# Patient Record
Sex: MALE | Race: OTHER | HISPANIC OR LATINO | Employment: FULL TIME | URBAN - METROPOLITAN AREA
[De-identification: names, ages, dates, MRNs, and addresses within clinical notes are randomized per-mention and may not be internally consistent; named-entity substitution may affect disease eponyms.]

---

## 2018-08-29 ENCOUNTER — APPOINTMENT (EMERGENCY)
Dept: RADIOLOGY | Facility: HOSPITAL | Age: 38
End: 2018-08-29
Payer: COMMERCIAL

## 2018-08-29 ENCOUNTER — HOSPITAL ENCOUNTER (EMERGENCY)
Facility: HOSPITAL | Age: 38
Discharge: HOME | End: 2018-08-30
Attending: EMERGENCY MEDICINE | Admitting: EMERGENCY MEDICINE
Payer: COMMERCIAL

## 2018-08-29 DIAGNOSIS — M25.431 WRIST SWELLING, RIGHT: ICD-10-CM

## 2018-08-29 DIAGNOSIS — M79.89 SWELLING OF RIGHT HAND: Primary | ICD-10-CM

## 2018-08-29 PROCEDURE — 73130 X-RAY EXAM OF HAND: CPT

## 2018-08-30 VITALS
HEART RATE: 82 BPM | WEIGHT: 249 LBS | OXYGEN SATURATION: 95 % | TEMPERATURE: 98.4 F | DIASTOLIC BLOOD PRESSURE: 67 MMHG | SYSTOLIC BLOOD PRESSURE: 111 MMHG | RESPIRATION RATE: 16 BRPM

## 2018-08-30 LAB
BASOPHILS # BLD AUTO: 0.07 THOUSANDS/ΜL (ref 0–0.1)
BASOPHILS NFR BLD AUTO: 1 % (ref 0–1)
CRP SERPL QL: 57.3 MG/L
EOSINOPHIL # BLD AUTO: 0.35 THOUSAND/ΜL (ref 0–0.61)
EOSINOPHIL NFR BLD AUTO: 3 % (ref 0–6)
ERYTHROCYTE [DISTWIDTH] IN BLOOD BY AUTOMATED COUNT: 12.8 % (ref 11.6–15.1)
ERYTHROCYTE [SEDIMENTATION RATE] IN BLOOD: 35 MM/HOUR (ref 0–10)
HCT VFR BLD AUTO: 46.1 % (ref 36.5–49.3)
HGB BLD-MCNC: 15.3 G/DL (ref 12–17)
IMM GRANULOCYTES # BLD AUTO: 0.08 THOUSAND/UL (ref 0–0.2)
IMM GRANULOCYTES NFR BLD AUTO: 1 % (ref 0–2)
LYMPHOCYTES # BLD AUTO: 2.58 THOUSANDS/ΜL (ref 0.6–4.47)
LYMPHOCYTES NFR BLD AUTO: 23 % (ref 14–44)
MCH RBC QN AUTO: 28.7 PG (ref 26.8–34.3)
MCHC RBC AUTO-ENTMCNC: 33.2 G/DL (ref 31.4–37.4)
MCV RBC AUTO: 87 FL (ref 82–98)
MONOCYTES # BLD AUTO: 0.77 THOUSAND/ΜL (ref 0.17–1.22)
MONOCYTES NFR BLD AUTO: 7 % (ref 4–12)
NEUTROPHILS # BLD AUTO: 7.32 THOUSANDS/ΜL (ref 1.85–7.62)
NEUTS SEG NFR BLD AUTO: 65 % (ref 43–75)
NRBC BLD AUTO-RTO: 0 /100 WBCS
PLATELET # BLD AUTO: 350 THOUSANDS/UL (ref 149–390)
PMV BLD AUTO: 9.5 FL (ref 8.9–12.7)
RBC # BLD AUTO: 5.33 MILLION/UL (ref 3.88–5.62)
WBC # BLD AUTO: 11.17 THOUSAND/UL (ref 4.31–10.16)

## 2018-08-30 PROCEDURE — 85025 COMPLETE CBC W/AUTO DIFF WBC: CPT | Performed by: EMERGENCY MEDICINE

## 2018-08-30 PROCEDURE — 99284 EMERGENCY DEPT VISIT MOD MDM: CPT

## 2018-08-30 PROCEDURE — 85652 RBC SED RATE AUTOMATED: CPT | Performed by: EMERGENCY MEDICINE

## 2018-08-30 PROCEDURE — 36415 COLL VENOUS BLD VENIPUNCTURE: CPT | Performed by: EMERGENCY MEDICINE

## 2018-08-30 PROCEDURE — 86140 C-REACTIVE PROTEIN: CPT | Performed by: EMERGENCY MEDICINE

## 2018-08-30 RX ORDER — PREDNISONE 20 MG/1
40 TABLET ORAL ONCE
Status: COMPLETED | OUTPATIENT
Start: 2018-08-30 | End: 2018-08-30

## 2018-08-30 RX ORDER — PREDNISONE 20 MG/1
40 TABLET ORAL DAILY
Qty: 8 TABLET | Refills: 0 | Status: SHIPPED | OUTPATIENT
Start: 2018-08-30 | End: 2018-09-03

## 2018-08-30 RX ORDER — ONDANSETRON 2 MG/ML
4 INJECTION INTRAMUSCULAR; INTRAVENOUS ONCE
Status: DISCONTINUED | OUTPATIENT
Start: 2018-08-30 | End: 2018-08-30 | Stop reason: HOSPADM

## 2018-08-30 RX ADMIN — PREDNISONE 40 MG: 20 TABLET ORAL at 02:18

## 2018-08-30 NOTE — ED PROVIDER NOTES
History  Chief Complaint   Patient presents with    Hand Swelling     pt has swelling to R hand that started friday  pt denies injury to the hand  pt reports severe pain hx of gout  42 yo M presents to ED for R hand swelling since Friday  Pt says she was at work when she noticed the swelling  Denies any exposures/contacts/insect bites/injuries  Pt says he feels like swelling is gradually worsening  He has pain that seems to move also  Initially was over the 5th metacarpal area, and now seems to be in the wrist and thumb areas  No fever/chills, nausea/vomiting  He denies any numbness/tingling  He was seen at urgent care on Monday and says he had XR done that showed "a chip in the bone" on his hand  Pt says he was sent home with Rx for Relafen and was supposed to have CT done outpatient  However, he says the CT required prior authorization, so he was unable to have it done  As the swelling has not improved, he decided to come to the ED  Pt does have history of gout but says this feels very different  None       Past Medical History:   Diagnosis Date    Gout        History reviewed  No pertinent surgical history  History reviewed  No pertinent family history  I have reviewed and agree with the history as documented  Social History   Substance Use Topics    Smoking status: Current Every Day Smoker     Packs/day: 0 50    Smokeless tobacco: Never Used    Alcohol use Yes      Comment: socially         Review of Systems   Constitutional: Negative for chills, fatigue and fever  HENT: Negative for congestion, rhinorrhea, sore throat and trouble swallowing  Eyes: Negative for pain, discharge and visual disturbance  Respiratory: Negative for cough, chest tightness and shortness of breath  Cardiovascular: Negative for chest pain, palpitations and leg swelling  Gastrointestinal: Negative for abdominal pain, nausea and vomiting     Genitourinary: Negative for decreased urine volume, dysuria, frequency and urgency  Musculoskeletal: Positive for arthralgias  Negative for gait problem, neck pain and neck stiffness  Skin: Negative for pallor, rash and wound  Neurological: Negative for dizziness, syncope, light-headedness and headaches  Physical Exam  ED Triage Vitals [08/29/18 2130]   Temperature Pulse Respirations Blood Pressure SpO2   98 4 °F (36 9 °C) 86 18 136/84 98 %      Temp Source Heart Rate Source Patient Position - Orthostatic VS BP Location FiO2 (%)   Oral Monitor Sitting Left arm --      Pain Score       7           Orthostatic Vital Signs  Vitals:    08/29/18 2130 08/30/18 0055   BP: 136/84 111/67   Pulse: 86 82   Patient Position - Orthostatic VS: Sitting        Physical Exam   Constitutional: He is oriented to person, place, and time  He appears well-developed and well-nourished  No distress  HENT:   Head: Normocephalic and atraumatic  Mouth/Throat: Oropharynx is clear and moist    Eyes: Conjunctivae and EOM are normal  Right eye exhibits no discharge  Left eye exhibits no discharge  Neck: Normal range of motion  Neck supple  Cardiovascular: Normal rate, regular rhythm and intact distal pulses  Pulmonary/Chest: Effort normal and breath sounds normal  No stridor  No respiratory distress  He exhibits no tenderness  Abdominal: Soft  Bowel sounds are normal  There is no tenderness  There is no rebound and no guarding  Musculoskeletal: He exhibits edema and tenderness  Swelling of R hand and wrist, tender to R wrist  Decreased ROM due to swelling   Neurological: He is alert and oriented to person, place, and time  No sensory deficit  He exhibits normal muscle tone  Skin: Skin is warm and dry  Capillary refill takes less than 2 seconds  No erythema  Subtle vesicular rash to few fingers of R hand   Nursing note and vitals reviewed        ED Medications  Medications   ondansetron (ZOFRAN) injection 4 mg (0 mg Intravenous Hold 8/30/18 0048)   predniSONE tablet 40 mg (not administered)       Diagnostic Studies  Results Reviewed     Procedure Component Value Units Date/Time    C-reactive protein [[id number]]  (Abnormal) Collected:  08/30/18 0024    Lab Status:  Final result Specimen:  Blood from Arm, Left Updated:  08/30/18 0046     CRP 57 3 (H) mg/L     CBC and differential [[id number]]  (Abnormal) Collected:  08/30/18 0024    Lab Status:  Final result Specimen:  Blood from Arm, Left Updated:  08/30/18 0031     WBC 11 17 (H) Thousand/uL      RBC 5 33 Million/uL      Hemoglobin 15 3 g/dL      Hematocrit 46 1 %      MCV 87 fL      MCH 28 7 pg      MCHC 33 2 g/dL      RDW 12 8 %      MPV 9 5 fL      Platelets 710 Thousands/uL      nRBC 0 /100 WBCs      Neutrophils Relative 65 %      Immat GRANS % 1 %      Lymphocytes Relative 23 %      Monocytes Relative 7 %      Eosinophils Relative 3 %      Basophils Relative 1 %      Neutrophils Absolute 7 32 Thousands/µL      Immature Grans Absolute 0 08 Thousand/uL      Lymphocytes Absolute 2 58 Thousands/µL      Monocytes Absolute 0 77 Thousand/µL      Eosinophils Absolute 0 35 Thousand/µL      Basophils Absolute 0 07 Thousands/µL     Sedimentation rate, automated [[id number]] Collected:  08/30/18 0024    Lab Status: In process Specimen:  Blood from Arm, Left Updated:  08/30/18 0028                 XR hand 3+ views RIGHT   Final Result by Brittany Rosario MD (08/30 0016)      No acute osseous abnormality  Workstation performed: [id number]               Procedures  Procedures       Phone Consults  ED Phone Contact    ED Course                               MDM  Number of Diagnoses or Management Options  Swelling of right hand:   Wrist swelling, right:   Diagnosis management comments: XR unremarkable  Do not see "bone chip" or anything requiring CT imaging  Does not appear infectious  CRP elevated at 57 3, WBC mildly elevated at 11  May be rheumatologic/inflammatory  Will treat with 5 day course of prednisone   First dose given in ED   Can continue NSAID at home  Return precautions discussed  Contact phone # to establish care with primary care provider given in discharge  CritCare Time    Disposition  Final diagnoses:   Swelling of right hand   Wrist swelling, right     Time reflects when diagnosis was documented in both MDM as applicable and the Disposition within this note     Time User Action Codes Description Comment    8/30/2018  2:09 AM Clearnce Arms Add [M79 89] Swelling of right hand     8/30/2018  2:10 AM Clearnce Arms Add [M25 248] Wrist swelling, right       ED Disposition     ED Disposition Condition Comment    Discharge  Davina Raymond discharge to home/self care  Condition at discharge: Stable        Follow-up Information     Follow up With Specialties Details Why Contact Info Additional [address] Emergency Department Emergency Medicine  If symptoms worsen, As needed [address] [facility] ED, [address] [address]   establish care with primary care provider [phone]             Patient's Medications   Discharge Prescriptions    PREDNISONE 20 MG TABLET    Take 2 tablets (40 mg total) by mouth daily for 4 days       Start Date: 8/30/2018 End Date: 9/3/2018       Order Dose: 40 mg       Quantity: 8 tablet    Refills: 0     No discharge procedures on file  ED Provider  Attending physically available and evaluated Davina Raymond I managed the patient along with the ED Attending      Electronically Signed by         Laura Singleton MD  08/30/18 2319

## 2018-08-30 NOTE — DISCHARGE INSTRUCTIONS
Swollen Joint   WHAT YOU NEED TO KNOW:   Joint swelling may occur in one or more joints  You may have other symptoms, such as pain, tenderness, or stiffness  A swollen joint may be caused by a variety of conditions such as arthritis, pseudogout, gout, tendinitis, or injury  DISCHARGE INSTRUCTIONS:   Return to the emergency department if:   · You cannot move your joint at all  · You have severe pain that does not get better with medicine  Contact your healthcare provider if:   · You have a fever  · You have redness or warmth over the joint  · The swelling does not decrease with treatment  · You have questions or concerns about your condition or care  Medicines:   · NSAIDs , such as ibuprofen, help decrease swelling, pain, and fever  This medicine is available with or without a doctor's order  NSAIDs can cause stomach bleeding or kidney problems in certain people  If you take blood thinner medicine, always ask your healthcare provider if NSAIDs are safe for you  Always read the medicine label and follow directions  · Take your medicine as directed  Contact your healthcare provider if you think your medicine is not helping or if you have side effects  Tell him of her if you are allergic to any medicine  Keep a list of the medicines, vitamins, and herbs you take  Include the amounts, and when and why you take them  Bring the list or the pill bottles to follow-up visits  Carry your medicine list with you in case of an emergency  Follow up with your healthcare provider as directed:  Write down your questions so you remember to ask them during your visits  Self-care:   · Rest  your swollen joint  Avoid activities that make the swelling or pain worse  You may need to avoid putting weight on your joint while you have pain  Crutches or a walker can be used to avoid putting weight on joints in your lower body  · Apply ice  on your swollen joint for 15 to 20 minutes every hour or as directed   Use an ice pack, or put crushed ice in a plastic bag  Cover it with a towel  Ice helps prevent tissue damage and decreases swelling and pain  · Apply heat  on your swollen joint for 20 to 30 minutes every 2 hours for as many days as directed  Heat helps decrease pain  · Elevate  your swollen joint above the level of your heart as often as you can  This will help decrease swelling and pain  Prop your joint on pillows or blankets to keep it elevated comfortably  © 2017 [address] [employer] Information is for End User's use only and may not be sold, redistributed or otherwise used for commercial purposes  All illustrations and images included in CareNotes® are the copyrighted property of [employer]  or [employer]etri[employer]  The above information is an  only  It is not intended as medical advice for individual conditions or treatments  Talk to your doctor, nurse or pharmacist before following any medical regimen to see if it is safe and effective for you

## 2018-08-30 NOTE — ED ATTENDING ATTESTATION
Migdalia Marquis MD, saw and evaluated the patient  I have discussed the patient with the resident/non-physician practitioner and agree with the resident's/non-physician practitioner's findings, Plan of Care, and MDM as documented in the resident's/non-physician practitioner's note, except where noted  All available labs and Radiology studies were reviewed  At this point I agree with the current assessment done in the Emergency Department  I have conducted an independent evaluation of this patient a history and physical is as follows:    OA: 42 y/o m with swelling to his R hand that has been ongoing and mildly progressive since Friday  Pt seen at urgent care at that time had an xray performed and advised to f/u for further evaluation  No fevers/chills  Pt works as a cook, 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